# Patient Record
Sex: MALE | Race: WHITE | Employment: FULL TIME | URBAN - METROPOLITAN AREA
[De-identification: names, ages, dates, MRNs, and addresses within clinical notes are randomized per-mention and may not be internally consistent; named-entity substitution may affect disease eponyms.]

---

## 2020-05-27 ENCOUNTER — HOSPITAL ENCOUNTER (EMERGENCY)
Age: 35
Discharge: HOME | End: 2020-05-27
Attending: EMERGENCY MEDICINE
Payer: COMMERCIAL

## 2020-05-27 VITALS
WEIGHT: 197 LBS | RESPIRATION RATE: 20 BRPM | SYSTOLIC BLOOD PRESSURE: 144 MMHG | DIASTOLIC BLOOD PRESSURE: 90 MMHG | TEMPERATURE: 98.1 F | HEART RATE: 76 BPM | OXYGEN SATURATION: 100 %

## 2020-05-27 LAB
EKG ATRIAL RATE: 70 BPM
EKG DIAGNOSIS: NORMAL
EKG P AXIS: 55 DEGREES
EKG P-R INTERVAL: 140 MS
EKG Q-T INTERVAL: 372 MS
EKG QRS DURATION: 92 MS
EKG QTC CALCULATION (BAZETT): 401 MS
EKG R AXIS: 39 DEGREES
EKG T AXIS: 25 DEGREES
EKG VENTRICULAR RATE: 70 BPM

## 2020-05-27 PROCEDURE — 99284 EMERGENCY DEPT VISIT MOD MDM: CPT

## 2020-05-27 PROCEDURE — 93005 ELECTROCARDIOGRAM TRACING: CPT | Performed by: EMERGENCY MEDICINE

## 2020-05-27 PROCEDURE — 93010 ELECTROCARDIOGRAM REPORT: CPT | Performed by: INTERNAL MEDICINE

## 2020-05-27 RX ORDER — LISINOPRIL 10 MG/1
10 TABLET ORAL DAILY
COMMUNITY
End: 2020-06-15 | Stop reason: SDUPTHER

## 2020-05-27 NOTE — ED TRIAGE NOTES
Patient c/o approx 1 week palpitations, admits to incr stress, h/o PTSD (pt [employer]) and anxiety, + HTN. States drank more ETOH over weekend. SR per EKG.

## 2020-05-27 NOTE — ED PROVIDER NOTES
EMERGENCY DEPARTMENT PHYSICIAN DOCUMENTATION      CHIEF COMPLAINT  Palpitations      Patient information was obtained from patient. History/Exam limitations: none. HISTORY OF PRESENT ILLNESS  Wilma Boast is a 29 y.o. male with complaint of Palpitations  . Onset 1 to 2 days ago. [place] started noticing it recently. Comes and goes. Went to urgent care this morning and then obtain an EKG which apparently was interpreted as atrial fibrillation, however on my interpretation it appears to be clear sinus. Denies chest pain, shortness of breath, nausea vomiting diarrhea, rectal bleeding or dark stools. REVIEW OF SYSTEMS  A full 10 point Review of Systems was performed and is negative aside from pertinent positives mentioned in HPI    ALLERGIES:  No Known Allergies    PAST HISTORY  Past Medical History:   Diagnosis Date    Anxiety     Hypertension        History reviewed. No pertinent family history. No current facility-administered medications on file prior to encounter.       Current Outpatient Medications on File Prior to Encounter   Medication Sig Dispense Refill    lisinopril (PRINIVIL;ZESTRIL) 10 MG tablet Take 10 mg by mouth daily         Social History     Tobacco Use    Smoking status: Not on file   Substance Use Topics    Alcohol use: Not on file    Drug use: Not on file     +marijuana use, + occasional etoh    EXAM:   Presentation Vital Signs: BP (!) 144/90   Pulse 76   Temp 98.1 °F (36.7 °C) (Oral)   Resp 20   Wt 89.4 kg (197 lb)   SpO2 100%     General: Well nourished, no acute distress  Head: No traumatic injury  ENT: MMM, no facial asymmetry, no nasal discharge  Eyes: EOM  Neck: No tracheal deviation or stridor  Lungs: Respirations clear to ausculation, no respiratory distress  Cardiac: Regular rate and rhythm without gallops, murmurs, or rubs  Abdomen: Soft, nontender  Skin: no pallor, erythema,

## 2020-06-01 PROBLEM — I10 ESSENTIAL HYPERTENSION: Status: ACTIVE | Noted: 2017-06-28

## 2020-06-01 NOTE — PROGRESS NOTES
Chief Complaint   Patient presents with    New Patient    Hypertension         HPI:  Mike Felix is a 29 y.o. (: 1985) here today to establish care and for mgmt of chronic conditions. Recently seen in ED for palpitations and workup was unremarkable. Prescribed metoprolol and told to follow up with PCP. Today he feels better. He has not used the metoprolol. HTN  Rx: lisinopril 10 mg daily  Compliant: yes  Does check BP at home. Brought logs: low 100s over 70s  No results found for: CREATININE     Tobacco use:  Started in  and quit in   Quit nicotine, vaping and lost 20 lbs. Has history of situational anxiety and PTSD. Uses MJ with card. Would like to try something else instead of MJ. Open to inderal.     Has a small dark spot on right ear he would like evaluated by dermatology. Has been there for many years. Used to be a  and was in the sun a lot as a [employer]. Review of Systems   Constitutional: Negative for activity change, chills, fatigue and fever. HENT: Negative for congestion. Eyes: Negative for redness. Respiratory: Negative for cough, chest tightness, shortness of breath and wheezing. Cardiovascular: Negative for chest pain and palpitations. Gastrointestinal: Negative for abdominal pain and diarrhea. Genitourinary: Negative for difficulty urinating. Musculoskeletal: Negative for arthralgias. Skin: Negative for rash. Allergic/Immunologic: Negative for immunocompromised state. Neurological: Negative for dizziness, light-headedness and headaches. Hematological: Negative for adenopathy. Psychiatric/Behavioral: Negative for behavioral problems.        Past Medical History:   Diagnosis Date    Anxiety     Hypertension        Family History   Adopted: Yes       Social History     Tobacco Use    Smoking status: Former Smoker     Packs/day: 1.00     Years: 10.00     Pack years: 10.00     Types: Cigarettes     Last attempt to quit:  Years since quittin.4    Smokeless tobacco: Former User     Types: Chew     Quit date: 2013   Substance Use Topics    Alcohol use: Yes     Comment: Moderately -Socially    Drug use: Yes     Types: Marijuana     Comment: Has medical MJ card-PTSD-Anxiety       New Prescriptions    PROPRANOLOL (INDERAL) 20 MG TABLET    Take 1 tablet by mouth 3 times daily       Meds Prior to visit:  Current Outpatient Medications on File Prior to Visit   Medication Sig Dispense Refill    lisinopril (PRINIVIL;ZESTRIL) 10 MG tablet Take 10 mg by mouth daily       No current facility-administered medications on file prior to visit. Allergies   Allergen Reactions    Nebivolol Hcl Other (See Comments)     Pt SAYS THE MEDICATION MADE HIM FEEL UNWELL       OBJECTIVE:  BP (!) 157/71   Pulse 96   Temp 97.3 °F (36.3 °C)   Ht 5' 9\" (1.753 m)   Wt 200 lb (90.7 kg)   BMI 29.53 kg/m²   BP Readings from Last 2 Encounters:   20 (!) 157/71   20 (!) 144/90     Wt Readings from Last 3 Encounters:   20 200 lb (90.7 kg)   20 197 lb (89.4 kg)       Physical Exam  Vitals signs and nursing note reviewed. Constitutional:       General: He is not in acute distress. Appearance: Normal appearance. HENT:      Head: Normocephalic and atraumatic. Left Ear: External ear normal.      Ears:        Comments: Hyperpigmented lesion, smooth borders     Nose: Nose normal.      Mouth/Throat:      Mouth: Mucous membranes are moist.      Pharynx: Oropharynx is clear. Eyes:      Extraocular Movements: Extraocular movements intact. Conjunctiva/sclera: Conjunctivae normal.      Pupils: Pupils are equal, round, and reactive to light. Neck:      Musculoskeletal: Normal range of motion. Cardiovascular:      Rate and Rhythm: Normal rate and regular rhythm. Pulses: Normal pulses. Heart sounds: Normal heart sounds. Pulmonary:      Effort: Pulmonary effort is normal. No respiratory distress.

## 2020-06-02 ENCOUNTER — OFFICE VISIT (OUTPATIENT)
Dept: PRIMARY CARE CLINIC | Age: 35
End: 2020-06-02
Payer: COMMERCIAL

## 2020-06-02 VITALS
HEIGHT: 69 IN | TEMPERATURE: 97.3 F | WEIGHT: 200 LBS | SYSTOLIC BLOOD PRESSURE: 157 MMHG | HEART RATE: 96 BPM | BODY MASS INDEX: 29.62 KG/M2 | DIASTOLIC BLOOD PRESSURE: 71 MMHG

## 2020-06-02 DIAGNOSIS — I10 ESSENTIAL HYPERTENSION: ICD-10-CM

## 2020-06-02 LAB
A/G RATIO: 2.2 (ref 1.1–2.2)
ALBUMIN SERPL-MCNC: 4.7 G/DL (ref 3.4–5)
ALP BLD-CCNC: 107 U/L (ref 40–129)
ALT SERPL-CCNC: 16 U/L (ref 10–40)
ANION GAP SERPL CALCULATED.3IONS-SCNC: 11 MMOL/L (ref 3–16)
AST SERPL-CCNC: 18 U/L (ref 15–37)
BASOPHILS ABSOLUTE: 0 K/UL (ref 0–0.2)
BASOPHILS RELATIVE PERCENT: 0.4 %
BILIRUB SERPL-MCNC: <0.2 MG/DL (ref 0–1)
BUN BLDV-MCNC: 16 MG/DL (ref 7–20)
CALCIUM SERPL-MCNC: 9.5 MG/DL (ref 8.3–10.6)
CHLORIDE BLD-SCNC: 104 MMOL/L (ref 99–110)
CHOLESTEROL, TOTAL: 128 MG/DL (ref 0–199)
CO2: 25 MMOL/L (ref 21–32)
CREAT SERPL-MCNC: 0.7 MG/DL (ref 0.9–1.3)
EOSINOPHILS ABSOLUTE: 0.3 K/UL (ref 0–0.6)
EOSINOPHILS RELATIVE PERCENT: 5.6 %
GFR AFRICAN AMERICAN: >60
GFR NON-AFRICAN AMERICAN: >60
GLOBULIN: 2.1 G/DL
GLUCOSE BLD-MCNC: 94 MG/DL (ref 70–99)
HCT VFR BLD CALC: 44.1 % (ref 40.5–52.5)
HDLC SERPL-MCNC: 40 MG/DL (ref 40–60)
HEMOGLOBIN: 15 G/DL (ref 13.5–17.5)
LDL CHOLESTEROL CALCULATED: 68 MG/DL
LYMPHOCYTES ABSOLUTE: 1.7 K/UL (ref 1–5.1)
LYMPHOCYTES RELATIVE PERCENT: 27.9 %
MCH RBC QN AUTO: 30.7 PG (ref 26–34)
MCHC RBC AUTO-ENTMCNC: 33.9 G/DL (ref 31–36)
MCV RBC AUTO: 90.5 FL (ref 80–100)
MONOCYTES ABSOLUTE: 0.4 K/UL (ref 0–1.3)
MONOCYTES RELATIVE PERCENT: 7.2 %
NEUTROPHILS ABSOLUTE: 3.6 K/UL (ref 1.7–7.7)
NEUTROPHILS RELATIVE PERCENT: 58.9 %
PDW BLD-RTO: 12.9 % (ref 12.4–15.4)
PLATELET # BLD: 220 K/UL (ref 135–450)
PMV BLD AUTO: 10.1 FL (ref 5–10.5)
POTASSIUM SERPL-SCNC: 4.4 MMOL/L (ref 3.5–5.1)
RBC # BLD: 4.87 M/UL (ref 4.2–5.9)
SODIUM BLD-SCNC: 140 MMOL/L (ref 136–145)
TOTAL PROTEIN: 6.8 G/DL (ref 6.4–8.2)
TRIGL SERPL-MCNC: 98 MG/DL (ref 0–150)
VLDLC SERPL CALC-MCNC: 20 MG/DL
WBC # BLD: 6.1 K/UL (ref 4–11)

## 2020-06-02 PROCEDURE — G8427 DOCREV CUR MEDS BY ELIG CLIN: HCPCS | Performed by: FAMILY MEDICINE

## 2020-06-02 PROCEDURE — G8419 CALC BMI OUT NRM PARAM NOF/U: HCPCS | Performed by: FAMILY MEDICINE

## 2020-06-02 PROCEDURE — 1036F TOBACCO NON-USER: CPT | Performed by: FAMILY MEDICINE

## 2020-06-02 PROCEDURE — 99204 OFFICE O/P NEW MOD 45 MIN: CPT | Performed by: FAMILY MEDICINE

## 2020-06-02 RX ORDER — PROPRANOLOL HYDROCHLORIDE 20 MG/1
20 TABLET ORAL 3 TIMES DAILY
Qty: 90 TABLET | Refills: 3 | Status: SHIPPED | OUTPATIENT
Start: 2020-06-02

## 2020-06-02 ASSESSMENT — PATIENT HEALTH QUESTIONNAIRE - PHQ9
1. LITTLE INTEREST OR PLEASURE IN DOING THINGS: 0
SUM OF ALL RESPONSES TO PHQ QUESTIONS 1-9: 0
2. FEELING DOWN, DEPRESSED OR HOPELESS: 0
SUM OF ALL RESPONSES TO PHQ QUESTIONS 1-9: 0
SUM OF ALL RESPONSES TO PHQ9 QUESTIONS 1 & 2: 0

## 2020-06-02 ASSESSMENT — ENCOUNTER SYMPTOMS
WHEEZING: 0
SHORTNESS OF BREATH: 0
DIARRHEA: 0
CHEST TIGHTNESS: 0
EYE REDNESS: 0
ABDOMINAL PAIN: 0
COUGH: 0

## 2020-06-15 ENCOUNTER — PATIENT MESSAGE (OUTPATIENT)
Dept: PRIMARY CARE CLINIC | Age: 35
End: 2020-06-15

## 2020-06-15 RX ORDER — LISINOPRIL 10 MG/1
10 TABLET ORAL DAILY
Qty: 30 TABLET | Refills: 3 | Status: SHIPPED | OUTPATIENT
Start: 2020-06-15 | End: 2020-09-15 | Stop reason: SDUPTHER

## 2020-06-15 NOTE — TELEPHONE ENCOUNTER
From: Mary Hart  To: Justin Bowen MD  Sent: 6/15/2020 7:50 AM EDT  Subject: Prescription Question    Good morning. I am prescribed Lisinopril 10MG but it will not let me request a refill via MyChart. Would it be possible to change that or set up the refill online? thanks!

## 2020-09-09 NOTE — PROGRESS NOTES
Chief Complaint   Patient presents with    Hypertension    Anxiety         HPI:  Sonia Quigley is a 29 y.o. (: 1985) here today for 3 month follow up. HTN  Rx: lisinopril 10 mg daily  --> feels like this is causing an erectile dysfunction   He may want to try sildenafil. He has in the past and it helped. Also discussed switching blood pressure medicines to Norvasc. Will think about this and get back to me. When he checks blood pressure at home it is around 120/80 on lisinopril. Compliant: yes  Lab Results   Component Value Date    CREATININE 0.7 (L) 2020     Anxiety  Was using MJ but wanted to try Inderal.  Only used Inderal twice. Now that he is working from home, his stress about presentations at work is much better. He may have some PTSD from his previous [employer] history. He would like to establish with behavioral health sometime in the near future. Review of Systems   Constitutional: Negative for appetite change, chills, diaphoresis, fatigue, fever and unexpected weight change. HENT: Negative for congestion, postnasal drip, rhinorrhea, sinus pressure, sneezing and sore throat. Eyes: Negative for redness, itching and visual disturbance. Respiratory: Negative for cough, chest tightness, shortness of breath and wheezing. Cardiovascular: Negative for chest pain, palpitations and leg swelling. Gastrointestinal: Negative for abdominal pain, blood in stool, constipation, diarrhea, nausea and vomiting. Endocrine: Negative for cold intolerance, heat intolerance, polydipsia and polyuria. Genitourinary: Negative for decreased urine volume and dysuria. Musculoskeletal: Negative for arthralgias, back pain, joint swelling, myalgias and neck pain. Skin: Negative for rash and wound. Allergic/Immunologic: Negative for environmental allergies. Neurological: Negative for dizziness, tremors, syncope, weakness, light-headedness, numbness and headaches.    Hematological: Negative for adenopathy. Does not bruise/bleed easily. Psychiatric/Behavioral: Negative for agitation, behavioral problems, confusion, decreased concentration, self-injury, sleep disturbance and suicidal ideas. The patient is nervous/anxious. Past Medical History:   Diagnosis Date    Anxiety     Hypertension        Family History   Adopted: Yes       Social History     Tobacco Use    Smoking status: Former Smoker     Packs/day: 1.00     Years: 10.00     Pack years: 10.00     Types: Cigarettes     Last attempt to quit:      Years since quittin.6    Smokeless tobacco: Former User     Types: Chew     Quit date: 2013   Substance Use Topics    Alcohol use: Yes     Comment: Moderately -Socially    Drug use: Yes     Types: Marijuana     Comment: Has medical MJ card-PTSD-Anxiety       New Prescriptions    No medications on file       Meds Prior to visit:  Current Outpatient Medications on File Prior to Visit   Medication Sig Dispense Refill    lisinopril (PRINIVIL;ZESTRIL) 10 MG tablet Take 1 tablet by mouth daily 30 tablet 3    propranolol (INDERAL) 20 MG tablet Take 1 tablet by mouth 3 times daily 90 tablet 3     No current facility-administered medications on file prior to visit. Allergies   Allergen Reactions    Nebivolol Hcl Other (See Comments)     Pt SAYS THE MEDICATION MADE HIM FEEL UNWELL       OBJECTIVE:  BP (!) 157/95   Pulse 101   Temp 97.8 °F (36.6 °C)   Ht 5' 9\" (1.753 m)   Wt 198 lb (89.8 kg)   BMI 29.24 kg/m²   BP Readings from Last 2 Encounters:   09/10/20 (!) 157/95   20 (!) 157/71     Wt Readings from Last 3 Encounters:   09/10/20 198 lb (89.8 kg)   20 200 lb (90.7 kg)   20 197 lb (89.4 kg)       Physical Exam  Vitals signs and nursing note reviewed. Constitutional:       General: He is not in acute distress. Appearance: Normal appearance. He is well-developed and normal weight. He is not ill-appearing. HENT:      Head: Normocephalic and atraumatic. Right Ear: Tympanic membrane, ear canal and external ear normal. There is no impacted cerumen. Left Ear: Tympanic membrane, ear canal and external ear normal. There is impacted cerumen. Nose: Nose normal. No congestion or rhinorrhea. Mouth/Throat:      Mouth: Mucous membranes are moist.      Pharynx: Oropharynx is clear. No oropharyngeal exudate or posterior oropharyngeal erythema. Eyes:      General: No scleral icterus. Right eye: No discharge. Left eye: No discharge. Extraocular Movements: Extraocular movements intact. Conjunctiva/sclera: Conjunctivae normal.      Pupils: Pupils are equal, round, and reactive to light. Neck:      Musculoskeletal: Normal range of motion and neck supple. Cardiovascular:      Rate and Rhythm: Normal rate and regular rhythm. Pulses: Normal pulses. Heart sounds: Normal heart sounds. No murmur. Comments: Normal radial and pedal pulses  Pulmonary:      Effort: Pulmonary effort is normal. No respiratory distress. Breath sounds: Normal breath sounds. No wheezing. Chest:      Chest wall: No tenderness. Abdominal:      General: Bowel sounds are normal. There is no distension. Palpations: Abdomen is soft. There is no mass. Tenderness: There is no abdominal tenderness. Comments: Normal liver and spleen, no organomegaly. Musculoskeletal: Normal range of motion. General: No tenderness, deformity or signs of injury. Right lower leg: No edema. Left lower leg: No edema. Comments: Range of motion intact in all extremities   Lymphadenopathy:      Cervical: No cervical adenopathy. Skin:     General: Skin is warm and dry. Capillary Refill: Capillary refill takes less than 2 seconds. Findings: No erythema or rash. Neurological:      General: No focal deficit present. Mental Status: He is alert and oriented to person, place, and time. Mental status is at baseline. Sensory: No sensory deficit. Motor: No abnormal muscle tone. Coordination: Coordination normal.   Psychiatric:         Mood and Affect: Mood normal.         Behavior: Behavior normal.         Thought Content: Thought content normal.         Judgment: Judgment normal.      Comments:           Lab Results   Component Value Date    WBC 6.1 06/02/2020    HGB 15.0 06/02/2020    HCT 44.1 06/02/2020    MCV 90.5 06/02/2020     06/02/2020     Lab Results   Component Value Date     06/02/2020    K 4.4 06/02/2020     06/02/2020    CO2 25 06/02/2020    BUN 16 06/02/2020    CREATININE 0.7 (L) 06/02/2020    GLUCOSE 94 06/02/2020    CALCIUM 9.5 06/02/2020    PROT 6.8 06/02/2020    LABALBU 4.7 06/02/2020    BILITOT <0.2 06/02/2020    ALKPHOS 107 06/02/2020    AST 18 06/02/2020    ALT 16 06/02/2020    LABGLOM >60 06/02/2020    GFRAA >60 06/02/2020    AGRATIO 2.2 06/02/2020    GLOB 2.1 06/02/2020     Lab Results   Component Value Date    CHOL 128 06/02/2020     Lab Results   Component Value Date    TRIG 98 06/02/2020     Lab Results   Component Value Date    HDL 40 06/02/2020     Lab Results   Component Value Date    LDLCALC 68 06/02/2020     Lab Results   Component Value Date    LABVLDL 20 06/02/2020     No results found for: LABA1C      ASSESSMENT/PLAN:  1. Essential hypertension  We will continue his lisinopril 10 mg daily since it is helping his blood pressure. He always gets nervous when he comes to the doctor's office but at home, he gets normal numbers. Renal function is up-to-date. Follow-up in 3 months. Will let me know if he needs Norvasc instead to see if this does not cause erectile dysfunction. 2. Situational anxiety  Refer to behavioral health for possible PTSD. He thinks this may be causing his underlying anxiety and blood pressure. We will continue taking Inderal as needed and smoking marijuana which he says helps the best  - Ambulatory referral to Psychology    3.   Erectile dysfunction  May be side effect of lisinopril. This is what he thinks. Does not want anything just yet. Options are to try Norvasc or add sildenafil to lisinopril. Discussed use, benefit, and side effects of prescribed medications. Barriers to medication compliance addressed. All patient questions answered. Pt voiced understanding. RTC Return in about 3 months (around 12/10/2020) for BP and Situational Anxiety. No future appointments.     Kittie Buerger, MD  9/10/2020  2:58 PM

## 2020-09-10 ENCOUNTER — OFFICE VISIT (OUTPATIENT)
Dept: PRIMARY CARE CLINIC | Age: 35
End: 2020-09-10
Payer: COMMERCIAL

## 2020-09-10 VITALS
SYSTOLIC BLOOD PRESSURE: 157 MMHG | HEIGHT: 69 IN | DIASTOLIC BLOOD PRESSURE: 95 MMHG | WEIGHT: 198 LBS | BODY MASS INDEX: 29.33 KG/M2 | HEART RATE: 101 BPM | TEMPERATURE: 97.8 F

## 2020-09-10 PROBLEM — F41.8 SITUATIONAL ANXIETY: Status: ACTIVE | Noted: 2020-09-10

## 2020-09-10 PROCEDURE — G8419 CALC BMI OUT NRM PARAM NOF/U: HCPCS | Performed by: FAMILY MEDICINE

## 2020-09-10 PROCEDURE — 99214 OFFICE O/P EST MOD 30 MIN: CPT | Performed by: FAMILY MEDICINE

## 2020-09-10 PROCEDURE — G8427 DOCREV CUR MEDS BY ELIG CLIN: HCPCS | Performed by: FAMILY MEDICINE

## 2020-09-10 PROCEDURE — 1036F TOBACCO NON-USER: CPT | Performed by: FAMILY MEDICINE

## 2020-09-10 ASSESSMENT — ENCOUNTER SYMPTOMS
SHORTNESS OF BREATH: 0
CONSTIPATION: 0
EYE ITCHING: 0
COUGH: 0
BLOOD IN STOOL: 0
BACK PAIN: 0
ABDOMINAL PAIN: 0
CHEST TIGHTNESS: 0
EYE REDNESS: 0
SINUS PRESSURE: 0
SORE THROAT: 0
NAUSEA: 0
DIARRHEA: 0
VOMITING: 0
WHEEZING: 0
RHINORRHEA: 0

## 2020-09-10 ASSESSMENT — PATIENT HEALTH QUESTIONNAIRE - PHQ9
SUM OF ALL RESPONSES TO PHQ QUESTIONS 1-9: 0
SUM OF ALL RESPONSES TO PHQ9 QUESTIONS 1 & 2: 0
2. FEELING DOWN, DEPRESSED OR HOPELESS: 0
SUM OF ALL RESPONSES TO PHQ QUESTIONS 1-9: 0
1. LITTLE INTEREST OR PLEASURE IN DOING THINGS: 0

## 2020-09-14 ENCOUNTER — PATIENT MESSAGE (OUTPATIENT)
Dept: PRIMARY CARE CLINIC | Age: 35
End: 2020-09-14

## 2020-09-15 RX ORDER — SILDENAFIL 50 MG/1
TABLET, FILM COATED ORAL
COMMUNITY
Start: 2020-08-15 | End: 2020-09-15 | Stop reason: SDUPTHER

## 2020-09-15 RX ORDER — SILDENAFIL 50 MG/1
25 TABLET, FILM COATED ORAL PRN
Qty: 30 TABLET | Refills: 3 | Status: SHIPPED | OUTPATIENT
Start: 2020-09-15

## 2020-09-15 RX ORDER — LISINOPRIL 10 MG/1
10 TABLET ORAL DAILY
Qty: 30 TABLET | Refills: 3 | Status: SHIPPED | OUTPATIENT
Start: 2020-09-15 | End: 2020-12-08 | Stop reason: SDUPTHER

## 2020-10-12 NOTE — PROGRESS NOTES
Chief Complaint   Patient presents with    Pharyngitis     about 1 wks, neck feels tight and swollen, brother in law has a throat infrection. does not feel sick       HPI:  Sania Powers is a 28 y.o. (: 1985) here today for sore neck and swollen LNs. Started 2 weeks ago. Throat was sore and he saw NP who said it was likely allergies and PND. Started to get better with antihistamine. No fevers, chills, sick contacts, cough, SOB, trouble swallowing, GI symptoms. Elevated BP in office as usual.   Too BP before he left home after a run and it was 130/77. Took his lisinopril today. He is compliant    Review of Systems   Constitutional: Negative for appetite change, chills, diaphoresis, fatigue, fever and unexpected weight change. HENT: Positive for sore throat. Negative for congestion, postnasal drip, rhinorrhea, sinus pressure and sneezing. Eyes: Negative for redness, itching and visual disturbance. Respiratory: Negative for cough, chest tightness, shortness of breath and wheezing. Cardiovascular: Negative for chest pain, palpitations and leg swelling. Gastrointestinal: Negative for abdominal pain, blood in stool, constipation, diarrhea, nausea and vomiting. Endocrine: Negative for cold intolerance, heat intolerance, polydipsia and polyuria. Genitourinary: Negative for decreased urine volume and dysuria. Musculoskeletal: Negative for arthralgias, back pain, joint swelling, myalgias and neck pain. Skin: Negative for rash and wound. Allergic/Immunologic: Negative for environmental allergies. Neurological: Negative for dizziness, tremors, syncope, weakness, light-headedness, numbness and headaches. Hematological: Positive for adenopathy. Does not bruise/bleed easily. Psychiatric/Behavioral: Negative for agitation, behavioral problems, confusion, decreased concentration, self-injury, sleep disturbance and suicidal ideas. The patient is not nervous/anxious.         Past Medical History:   Diagnosis Date    Anxiety     Hypertension        Family History   Adopted: Yes       Social History     Tobacco Use    Smoking status: Former Smoker     Packs/day: 1.00     Years: 10.00     Pack years: 10.00     Types: Cigarettes     Last attempt to quit: 2013     Years since quittin.7    Smokeless tobacco: Former User     Types: Chew     Quit date: 2013   Substance Use Topics    Alcohol use: Yes     Comment: Moderately -Socially    Drug use: Yes     Types: Marijuana     Comment: Has medical MJ card-PTSD-Anxiety       New Prescriptions    No medications on file       Meds Prior to visit:  Current Outpatient Medications on File Prior to Visit   Medication Sig Dispense Refill    lisinopril (PRINIVIL;ZESTRIL) 10 MG tablet Take 1 tablet by mouth daily 30 tablet 3    sildenafil (VIAGRA) 50 MG tablet Take 0.5 tablets by mouth as needed for Erectile Dysfunction 30 tablet 3    propranolol (INDERAL) 20 MG tablet Take 1 tablet by mouth 3 times daily 90 tablet 3     No current facility-administered medications on file prior to visit. Allergies   Allergen Reactions    Nebivolol Hcl Other (See Comments)     Pt SAYS THE MEDICATION MADE HIM FEEL UNWELL       OBJECTIVE:  BP (!) 161/105   Pulse 97   Temp 97.7 °F (36.5 °C) (Temporal)   Resp 16   Wt 205 lb (93 kg)   BMI 30.27 kg/m²   BP Readings from Last 2 Encounters:   10/13/20 (!) 161/105   09/10/20 (!) 157/95     Wt Readings from Last 3 Encounters:   10/13/20 205 lb (93 kg)   09/10/20 198 lb (89.8 kg)   20 200 lb (90.7 kg)       Physical Exam  Vitals signs reviewed. Constitutional:       General: He is not in acute distress. Appearance: Normal appearance. He is well-developed. He is not ill-appearing. HENT:      Head: Normocephalic and atraumatic. Right Ear: Tympanic membrane, ear canal and external ear normal. There is no impacted cerumen.       Left Ear: Tympanic membrane, ear canal and external ear normal. There is no impacted cerumen. Nose: Nose normal. No congestion or rhinorrhea. Mouth/Throat:      Mouth: Mucous membranes are moist.      Pharynx: Oropharynx is clear. No oropharyngeal exudate or posterior oropharyngeal erythema. Eyes:      General: No scleral icterus. Right eye: No discharge. Left eye: No discharge. Extraocular Movements: Extraocular movements intact. Conjunctiva/sclera: Conjunctivae normal.      Pupils: Pupils are equal, round, and reactive to light. Neck:      Musculoskeletal: Normal range of motion and neck supple. No neck rigidity or muscular tenderness. Vascular: No carotid bruit. Cardiovascular:      Rate and Rhythm: Normal rate and regular rhythm. Pulses: Normal pulses. Heart sounds: Normal heart sounds. No murmur. Comments: Normal radial and pedal pulses  Pulmonary:      Effort: Pulmonary effort is normal. No respiratory distress. Breath sounds: Normal breath sounds. No wheezing. Chest:      Chest wall: No tenderness. Abdominal:      General: Bowel sounds are normal. There is no distension. Palpations: Abdomen is soft. There is no mass. Tenderness: There is no abdominal tenderness. Comments: Normal liver and spleen, no organomegaly. Musculoskeletal: Normal range of motion. General: No tenderness or deformity. Right lower leg: No edema. Left lower leg: No edema. Comments: Range of motion intact in all extremities   Lymphadenopathy:      Cervical: No cervical adenopathy. Skin:     General: Skin is warm and dry. Capillary Refill: Capillary refill takes less than 2 seconds. Findings: No erythema or rash. Neurological:      General: No focal deficit present. Mental Status: He is alert and oriented to person, place, and time. Mental status is at baseline. Cranial Nerves: No cranial nerve deficit. Sensory: No sensory deficit. Motor: No weakness or abnormal muscle tone. Emily Villegas MD  10/13/2020  12:38 PM

## 2020-10-13 ENCOUNTER — OFFICE VISIT (OUTPATIENT)
Dept: PRIMARY CARE CLINIC | Age: 35
End: 2020-10-13
Payer: COMMERCIAL

## 2020-10-13 VITALS
SYSTOLIC BLOOD PRESSURE: 161 MMHG | DIASTOLIC BLOOD PRESSURE: 105 MMHG | RESPIRATION RATE: 16 BRPM | TEMPERATURE: 97.7 F | WEIGHT: 205 LBS | HEART RATE: 97 BPM | BODY MASS INDEX: 30.27 KG/M2

## 2020-10-13 PROCEDURE — 1036F TOBACCO NON-USER: CPT | Performed by: FAMILY MEDICINE

## 2020-10-13 PROCEDURE — G8417 CALC BMI ABV UP PARAM F/U: HCPCS | Performed by: FAMILY MEDICINE

## 2020-10-13 PROCEDURE — 99214 OFFICE O/P EST MOD 30 MIN: CPT | Performed by: FAMILY MEDICINE

## 2020-10-13 PROCEDURE — G8484 FLU IMMUNIZE NO ADMIN: HCPCS | Performed by: FAMILY MEDICINE

## 2020-10-13 PROCEDURE — G8427 DOCREV CUR MEDS BY ELIG CLIN: HCPCS | Performed by: FAMILY MEDICINE

## 2020-10-13 ASSESSMENT — ENCOUNTER SYMPTOMS
CHEST TIGHTNESS: 0
EYE REDNESS: 0
COUGH: 0
SINUS PRESSURE: 0
CONSTIPATION: 0
VOMITING: 0
SORE THROAT: 1
ABDOMINAL PAIN: 0
BACK PAIN: 0
DIARRHEA: 0
BLOOD IN STOOL: 0
NAUSEA: 0
RHINORRHEA: 0
SHORTNESS OF BREATH: 0
EYE ITCHING: 0
WHEEZING: 0

## 2020-11-02 ENCOUNTER — TELEPHONE (OUTPATIENT)
Dept: PRIMARY CARE CLINIC | Age: 35
End: 2020-11-02

## 2020-11-02 NOTE — TELEPHONE ENCOUNTER
Patient calling wife just tested positive for COVID and with patient having high blood pressure he was concerned. He would like to know if he needs to take any extra precautions? He states that he will go to the walkin clinic for testing by his house.

## 2020-11-02 NOTE — TELEPHONE ENCOUNTER
Patient does not need to take any extra precaution. Try to social distance, wash hands and wear a mask. If you would like a Covid test, let me know and I will order an ambulatory rapid test at the flu clinic.     Dr. Marilee Dale

## 2020-12-08 RX ORDER — LISINOPRIL 10 MG/1
10 TABLET ORAL DAILY
Qty: 30 TABLET | Refills: 3 | Status: SHIPPED | OUTPATIENT
Start: 2020-12-08 | End: 2021-02-24 | Stop reason: SDUPTHER

## 2021-02-24 DIAGNOSIS — I10 ESSENTIAL HYPERTENSION: ICD-10-CM

## 2021-02-24 RX ORDER — LISINOPRIL 10 MG/1
10 TABLET ORAL DAILY
Qty: 30 TABLET | Refills: 3 | Status: SHIPPED | OUTPATIENT
Start: 2021-02-24

## 2022-11-29 ENCOUNTER — OFFICE VISIT (OUTPATIENT)
Dept: PRIMARY CARE CLINIC | Age: 37
End: 2022-11-29
Payer: COMMERCIAL

## 2022-11-29 VITALS
WEIGHT: 213 LBS | HEART RATE: 102 BPM | BODY MASS INDEX: 31.55 KG/M2 | TEMPERATURE: 97 F | DIASTOLIC BLOOD PRESSURE: 81 MMHG | SYSTOLIC BLOOD PRESSURE: 164 MMHG | HEIGHT: 69 IN

## 2022-11-29 DIAGNOSIS — I10 ESSENTIAL HYPERTENSION: ICD-10-CM

## 2022-11-29 DIAGNOSIS — I10 ESSENTIAL HYPERTENSION: Primary | ICD-10-CM

## 2022-11-29 DIAGNOSIS — R19.7 DIARRHEA, UNSPECIFIED TYPE: ICD-10-CM

## 2022-11-29 DIAGNOSIS — R19.5 MUCUS IN STOOL: ICD-10-CM

## 2022-11-29 DIAGNOSIS — K92.1 BLOOD IN STOOL: ICD-10-CM

## 2022-11-29 LAB
A/G RATIO: 2.3 (ref 1.1–2.2)
ALBUMIN SERPL-MCNC: 4.6 G/DL (ref 3.4–5)
ALP BLD-CCNC: 96 U/L (ref 40–129)
ALT SERPL-CCNC: 19 U/L (ref 10–40)
ANION GAP SERPL CALCULATED.3IONS-SCNC: 12 MMOL/L (ref 3–16)
AST SERPL-CCNC: 17 U/L (ref 15–37)
BASOPHILS ABSOLUTE: 0 K/UL (ref 0–0.2)
BASOPHILS RELATIVE PERCENT: 0.3 %
BILIRUB SERPL-MCNC: 0.3 MG/DL (ref 0–1)
BUN BLDV-MCNC: 12 MG/DL (ref 7–20)
CALCIUM SERPL-MCNC: 9.8 MG/DL (ref 8.3–10.6)
CHLORIDE BLD-SCNC: 106 MMOL/L (ref 99–110)
CO2: 25 MMOL/L (ref 21–32)
CREAT SERPL-MCNC: 0.7 MG/DL (ref 0.9–1.3)
EOSINOPHILS ABSOLUTE: 0.1 K/UL (ref 0–0.6)
EOSINOPHILS RELATIVE PERCENT: 1.4 %
GFR SERPL CREATININE-BSD FRML MDRD: >60 ML/MIN/{1.73_M2}
GLUCOSE BLD-MCNC: 104 MG/DL (ref 70–99)
HCT VFR BLD CALC: 46.9 % (ref 40.5–52.5)
HEMOGLOBIN: 15.6 G/DL (ref 13.5–17.5)
IGA: 91 MG/DL (ref 70–400)
LYMPHOCYTES ABSOLUTE: 1.7 K/UL (ref 1–5.1)
LYMPHOCYTES RELATIVE PERCENT: 26.4 %
MCH RBC QN AUTO: 30.2 PG (ref 26–34)
MCHC RBC AUTO-ENTMCNC: 33.4 G/DL (ref 31–36)
MCV RBC AUTO: 90.5 FL (ref 80–100)
MONOCYTES ABSOLUTE: 0.3 K/UL (ref 0–1.3)
MONOCYTES RELATIVE PERCENT: 5.1 %
NEUTROPHILS ABSOLUTE: 4.4 K/UL (ref 1.7–7.7)
NEUTROPHILS RELATIVE PERCENT: 66.8 %
PDW BLD-RTO: 12.8 % (ref 12.4–15.4)
PLATELET # BLD: 227 K/UL (ref 135–450)
PMV BLD AUTO: 10.6 FL (ref 5–10.5)
POTASSIUM SERPL-SCNC: 4.5 MMOL/L (ref 3.5–5.1)
RBC # BLD: 5.18 M/UL (ref 4.2–5.9)
SODIUM BLD-SCNC: 143 MMOL/L (ref 136–145)
TOTAL PROTEIN: 6.6 G/DL (ref 6.4–8.2)
WBC # BLD: 6.6 K/UL (ref 4–11)

## 2022-11-29 PROCEDURE — G8417 CALC BMI ABV UP PARAM F/U: HCPCS | Performed by: FAMILY MEDICINE

## 2022-11-29 PROCEDURE — G8484 FLU IMMUNIZE NO ADMIN: HCPCS | Performed by: FAMILY MEDICINE

## 2022-11-29 PROCEDURE — 99214 OFFICE O/P EST MOD 30 MIN: CPT | Performed by: FAMILY MEDICINE

## 2022-11-29 PROCEDURE — 1036F TOBACCO NON-USER: CPT | Performed by: FAMILY MEDICINE

## 2022-11-29 PROCEDURE — G8427 DOCREV CUR MEDS BY ELIG CLIN: HCPCS | Performed by: FAMILY MEDICINE

## 2022-11-29 PROCEDURE — 3077F SYST BP >= 140 MM HG: CPT | Performed by: FAMILY MEDICINE

## 2022-11-29 PROCEDURE — 3078F DIAST BP <80 MM HG: CPT | Performed by: FAMILY MEDICINE

## 2022-11-29 RX ORDER — AZELAIC ACID 0.15 G/G
GEL TOPICAL
COMMUNITY
Start: 2022-11-17 | End: 2022-11-29

## 2022-11-29 SDOH — ECONOMIC STABILITY: FOOD INSECURITY: WITHIN THE PAST 12 MONTHS, THE FOOD YOU BOUGHT JUST DIDN'T LAST AND YOU DIDN'T HAVE MONEY TO GET MORE.: NEVER TRUE

## 2022-11-29 SDOH — ECONOMIC STABILITY: FOOD INSECURITY: WITHIN THE PAST 12 MONTHS, YOU WORRIED THAT YOUR FOOD WOULD RUN OUT BEFORE YOU GOT MONEY TO BUY MORE.: NEVER TRUE

## 2022-11-29 ASSESSMENT — ENCOUNTER SYMPTOMS
ABDOMINAL DISTENTION: 0
DIARRHEA: 1
WHEEZING: 0
COUGH: 0
RECTAL PAIN: 0
VOMITING: 0
EYE REDNESS: 0
CONSTIPATION: 0
BLOOD IN STOOL: 1
SHORTNESS OF BREATH: 0
CHEST TIGHTNESS: 0
NAUSEA: 0
ABDOMINAL PAIN: 0

## 2022-11-29 ASSESSMENT — SOCIAL DETERMINANTS OF HEALTH (SDOH): HOW HARD IS IT FOR YOU TO PAY FOR THE VERY BASICS LIKE FOOD, HOUSING, MEDICAL CARE, AND HEATING?: NOT HARD AT ALL

## 2022-11-29 ASSESSMENT — PATIENT HEALTH QUESTIONNAIRE - PHQ9
SUM OF ALL RESPONSES TO PHQ QUESTIONS 1-9: 0
SUM OF ALL RESPONSES TO PHQ9 QUESTIONS 1 & 2: 0
1. LITTLE INTEREST OR PLEASURE IN DOING THINGS: 0
SUM OF ALL RESPONSES TO PHQ QUESTIONS 1-9: 0
2. FEELING DOWN, DEPRESSED OR HOPELESS: 0
SUM OF ALL RESPONSES TO PHQ QUESTIONS 1-9: 0
SUM OF ALL RESPONSES TO PHQ QUESTIONS 1-9: 0

## 2022-11-29 NOTE — PROGRESS NOTES
Chief Complaint   Patient presents with    Follow-up     Ibs, stomach  issues on and off for a few months needs a referral for gi            ASSESSMENT/PLAN:  1. Essential hypertension  Uncontrolled in triage but home numbers are normal  Update labs  Continue lisinopril 10 mg and checking blood pressure at home periodically  If 140/90 or above, will let me know and we will titrate medication accordingly  - Comprehensive Metabolic Panel; Future  - CBC with Auto Differential; Future    2. Blood in stool  Referral placed to gastroenterology per patient request  Rule out celiac disease given sensitivity to wheat  Follow-up in 4 to 6 weeks with dietary changes discussed  - ROYCE Mercado MD, Gastroenterology, [facility]  - Celiac Screen with Reflex; Future    3. Diarrhea, unspecified type  As above  - Mykel Rachel MD, Gastroenterology, [facility]  - Celiac Screen with Reflex; Future    4. Mucus in stool  As above  - ROYCE Mercado MD, Gastroenterology, [facility]  - Celiac Screen with Reflex; Future       HPI:  Juan A Valente is a 40 y.o. (: 1985) here today for chronic condition mgmt and GI issues. HTN  Rx: lisinopril 10 mg daily  When he checks blood pressure at home it is around 120s/80s on lisinopril. BP Readings from Last 3 Encounters:   22 (!) 164/81   10/13/20 (!) 161/105   09/10/20 (!) 157/95     Lab Results   Component Value Date    CREATININE 0.7 (L) 2022     Wt Readings from Last 3 Encounters:   22 213 lb (96.6 kg)   10/13/20 205 lb (93 kg)   09/10/20 198 lb (89.8 kg)     Recently moved back from [place] and has been having GI issues. For the last 9 months he has been having bouts of diarrhea and stomach pain. Has kept track and these issues seem to be caused by wheat, pork and large amounts of cheese. He will experience diarrhea 4 days along with mucus and blood.   No abdominal pain, fevers, chills, decrease in appetite, nausea, vomiting or acid reflux. These flares will happen once a month or once every 2 months. Does have distant history of hemorrhoids. Review of Systems   Constitutional:  Negative for activity change, chills, fatigue and fever. HENT:  Negative for congestion. Eyes:  Negative for redness. Respiratory:  Negative for cough, chest tightness, shortness of breath and wheezing. Cardiovascular:  Negative for chest pain and palpitations. Gastrointestinal:  Positive for blood in stool and diarrhea. Negative for abdominal distention, abdominal pain, constipation, nausea, rectal pain and vomiting. Genitourinary:  Negative for difficulty urinating. Musculoskeletal:  Negative for arthralgias. Skin:  Negative for rash. Allergic/Immunologic: Negative for immunocompromised state. Neurological:  Negative for dizziness, light-headedness and headaches. Hematological:  Negative for adenopathy. Psychiatric/Behavioral:  Negative for behavioral problems. Past Medical History:   Diagnosis Date    Anxiety     Hypertension        Family History   Adopted: Yes       Social History     Tobacco Use    Smoking status: Former     Packs/day: 1.00     Years: 10.00     Pack years: 10.00     Types: Cigarettes     Quit date: 2013     Years since quittin.9    Smokeless tobacco: Former     Types: Chew     Quit date: 2013   Vaping Use    Vaping Use: Former    Quit date: 2019    Substances: Always   Substance Use Topics    Alcohol use: Yes     Comment: Moderately -Socially    Drug use: Yes     Types: Marijuana Deadra Howard)     Comment: Has medical MJ card-PTSD-Anxiety       New Prescriptions    No medications on file       Meds Prior to visit:  Current Outpatient Medications on File Prior to Visit   Medication Sig Dispense Refill    lisinopril (PRINIVIL;ZESTRIL) 10 MG tablet Take 1 tablet by mouth daily 30 tablet 3     No current facility-administered medications on file prior to visit.      Allergies   Allergen Reactions    Nebivolol Hcl Other (See Comments)     Pt SAYS THE MEDICATION MADE HIM FEEL UNWELL         OBJECTIVE:  BP (!) 164/81   Pulse (!) 102   Temp 97 °F (36.1 °C) (Temporal)   Ht 5' 9\" (1.753 m)   Wt 213 lb (96.6 kg)   BMI 31.45 kg/m²   BP Readings from Last 2 Encounters:   11/29/22 (!) 164/81   10/13/20 (!) 161/105     Wt Readings from Last 3 Encounters:   11/29/22 213 lb (96.6 kg)   10/13/20 205 lb (93 kg)   09/10/20 198 lb (89.8 kg)       Physical Exam  Vitals and nursing note reviewed. Constitutional:       General: He is not in acute distress. Appearance: Normal appearance. He is well-developed. He is obese. He is not ill-appearing. HENT:      Head: Normocephalic and atraumatic. Right Ear: Tympanic membrane, ear canal and external ear normal. There is no impacted cerumen. Left Ear: Ear canal and external ear normal. There is no impacted cerumen. Nose: Nose normal. No congestion or rhinorrhea. Mouth/Throat:      Mouth: Mucous membranes are moist.      Pharynx: Oropharynx is clear. No oropharyngeal exudate or posterior oropharyngeal erythema. Eyes:      General: No scleral icterus. Right eye: No discharge. Left eye: No discharge. Extraocular Movements: Extraocular movements intact. Conjunctiva/sclera: Conjunctivae normal.      Pupils: Pupils are equal, round, and reactive to light. Cardiovascular:      Rate and Rhythm: Normal rate and regular rhythm. Pulses: Normal pulses. Heart sounds: Normal heart sounds. No murmur heard. Comments: Normal radial and pedal pulses  Pulmonary:      Effort: Pulmonary effort is normal. No respiratory distress. Breath sounds: Normal breath sounds. No wheezing. Chest:      Chest wall: No tenderness. Abdominal:      General: Bowel sounds are normal. There is no distension. Palpations: Abdomen is soft. There is no mass. Tenderness: There is no abdominal tenderness.       Comments: Normal liver and spleen, no organomegaly. Musculoskeletal:         General: No tenderness, deformity or signs of injury. Normal range of motion. Cervical back: Normal range of motion and neck supple. Right lower leg: No edema. Left lower leg: No edema. Comments: Range of motion intact in all extremities   Lymphadenopathy:      Cervical: No cervical adenopathy. Skin:     General: Skin is warm and dry. Capillary Refill: Capillary refill takes less than 2 seconds. Findings: No erythema or rash. Neurological:      General: No focal deficit present. Mental Status: He is alert and oriented to person, place, and time. Mental status is at baseline. Sensory: No sensory deficit. Motor: No abnormal muscle tone. Coordination: Coordination normal.   Psychiatric:         Mood and Affect: Mood normal.         Behavior: Behavior normal.         Thought Content:  Thought content normal.         Judgment: Judgment normal.      Comments:         Lab Results   Component Value Date    WBC 6.6 11/29/2022    HGB 15.6 11/29/2022    HCT 46.9 11/29/2022    MCV 90.5 11/29/2022     11/29/2022     Lab Results   Component Value Date     11/29/2022    K 4.5 11/29/2022     11/29/2022    CO2 25 11/29/2022    BUN 12 11/29/2022    CREATININE 0.7 (L) 11/29/2022    GLUCOSE 104 (H) 11/29/2022    CALCIUM 9.8 11/29/2022    PROT 6.6 11/29/2022    LABALBU 4.6 11/29/2022    BILITOT 0.3 11/29/2022    ALKPHOS 96 11/29/2022    AST 17 11/29/2022    ALT 19 11/29/2022    LABGLOM >60 11/29/2022    GFRAA >60 06/02/2020    AGRATIO 2.3 (H) 11/29/2022    GLOB 2.1 06/02/2020     Lab Results   Component Value Date    CHOL 128 06/02/2020     Lab Results   Component Value Date    TRIG 98 06/02/2020     Lab Results   Component Value Date    HDL 40 06/02/2020     Lab Results   Component Value Date    LDLCALC 68 06/02/2020     Lab Results   Component Value Date    LABVLDL 20 06/02/2020     No results found for: LABA1C      Discussed use, benefit, and side effects of prescribed medications. Barriers to medication compliance addressed. All patient questions answered. Pt voiced understanding. RTC Return in about 4 weeks (around 12/27/2022). No future appointments.       Kurt Eugene MD  11/29/2022  5:32 PM

## 2022-11-30 LAB — TISSUE TRANSGLUTAMINASE IGA: <0.5 U/ML (ref 0–14)

## 2023-01-31 NOTE — PROGRESS NOTES
Chief Complaint   Patient presents with    Otitis Media         ASSESSMENT/PLAN:  1. Ear fullness, bilateral  Increased pressure in sinuses is causing his ears to feel full and the need for him to pop them. Start prednisone and montelukast, continue allegra and montelukast  Follow up in 2-4 weeks to gauge improvement  Discussed prognosis and duration of symptoms and when to RTC  - predniSONE (DELTASONE) 20 MG tablet; Take 1 tablet by mouth daily for 5 days  Dispense: 5 tablet; Refill: 0  - montelukast (SINGULAIR) 10 MG tablet; Take 1 tablet by mouth nightly  Dispense: 90 tablet; Refill: 1    2. Essential hypertension  Uncontrolled in triage. Home numbers are normal  Reviewed creatinine - normal  Continue lisinopril 10 mg daily  Follow up in 6 months to gauge improvement        HPI:  Ariana Dalal is a 40 y.o. (: 1985) here today for ear discomfort and sinus congestion. Sick contacts: not that he knows of but has been traveling/flying for work. Symptoms started about 2 weeks ago with  ear fullness and sinus pressure. He went to a walk in clinic and they gave him Augmentin for a BL ear infection. He did not have fevers. He continuously keeps plugging his nose and trying to pop his ears. Has PND. Has been taking allegra and flonase. Did not test for covid or flu. No body aches or GI symptoms other than his baseline. HTN  Rx: lisinopril 10 mg   Home numbers are WNL; 120s/80s per patient  BP Readings from Last 3 Encounters:   23 (!) 158/84   22 (!) 164/81   10/13/20 (!) 161/105     Lab Results   Component Value Date    CREATININE 0.7 (L) 2022       Review of Systems   Constitutional:  Negative for activity change, chills, fatigue and fever. HENT:  Positive for postnasal drip. Negative for congestion. Eyes:  Negative for redness. Respiratory:  Negative for cough, chest tightness, shortness of breath and wheezing. Cardiovascular:  Negative for chest pain and palpitations. Gastrointestinal:  Negative for abdominal pain and diarrhea. Genitourinary:  Negative for difficulty urinating. Musculoskeletal:  Negative for arthralgias. Skin:  Negative for rash. Allergic/Immunologic: Negative for immunocompromised state. Neurological:  Negative for dizziness, light-headedness and headaches. Hematological:  Negative for adenopathy. Psychiatric/Behavioral:  Negative for behavioral problems. Past Medical History:   Diagnosis Date    Anxiety     Hypertension        Family History   Adopted: Yes       Social History     Tobacco Use    Smoking status: Former     Packs/day: 1.00     Years: 10.00     Pack years: 10.00     Types: Cigarettes     Quit date: 1/1/2013     Years since quitting: 10.0    Smokeless tobacco: Former     Types: Chew     Quit date: 6/2/2013   Vaping Use    Vaping Use: Former    Quit date: 11/1/2019    Substances: Always   Substance Use Topics    Alcohol use: Yes     Comment: Moderately -Socially    Drug use: Yes     Types: Marijuana (Weed)     Comment: Has medical MJ card-PTSD-Anxiety       New Prescriptions    MONTELUKAST (SINGULAIR) 10 MG TABLET    Take 1 tablet by mouth nightly    PREDNISONE (DELTASONE) 20 MG TABLET    Take 1 tablet by mouth daily for 5 days       Meds Prior to visit:  Current Outpatient Medications on File Prior to Visit   Medication Sig Dispense Refill    fluticasone (FLONASE) 50 MCG/ACT nasal spray 1 spray by Each Nostril route daily      lisinopril (PRINIVIL;ZESTRIL) 10 MG tablet Take 1 tablet by mouth daily 30 tablet 3     No current facility-administered medications on file prior to visit.      Allergies   Allergen Reactions    Nebivolol Hcl Other (See Comments)     Pt SAYS THE MEDICATION MADE HIM FEEL UNWELL       OBJECTIVE:  BP (!) 158/84   Pulse 65   Temp 97 °F (36.1 °C) (Temporal)   Ht 5' 9\" (1.753 m)   Wt 217 lb 3.2 oz (98.5 kg)   BMI 32.07 kg/m²   BP Readings from Last 2 Encounters:   02/01/23 (!) 158/84   11/29/22 (!) 164/81 Wt Readings from Last 3 Encounters:   02/01/23 217 lb 3.2 oz (98.5 kg)   11/29/22 213 lb (96.6 kg)   10/13/20 205 lb (93 kg)       Physical Exam  Vitals and nursing note reviewed. Constitutional:       General: He is not in acute distress. Appearance: Normal appearance. HENT:      Head: Normocephalic and atraumatic. Right Ear: Tympanic membrane, ear canal and external ear normal. There is no impacted cerumen. Left Ear: Tympanic membrane, ear canal and external ear normal. There is no impacted cerumen. Nose: Nose normal.      Mouth/Throat:      Mouth: Mucous membranes are moist.      Pharynx: Oropharynx is clear. Eyes:      Extraocular Movements: Extraocular movements intact. Conjunctiva/sclera: Conjunctivae normal.      Pupils: Pupils are equal, round, and reactive to light. Cardiovascular:      Rate and Rhythm: Normal rate and regular rhythm. Pulses: Normal pulses. Heart sounds: Normal heart sounds. Pulmonary:      Effort: Pulmonary effort is normal. No respiratory distress. Breath sounds: Normal breath sounds. No wheezing. Abdominal:      General: Bowel sounds are normal.   Musculoskeletal:         General: No signs of injury. Normal range of motion. Cervical back: Normal range of motion. Skin:     General: Skin is warm. Capillary Refill: Capillary refill takes less than 2 seconds. Findings: No erythema. Neurological:      General: No focal deficit present. Mental Status: He is alert and oriented to person, place, and time. Mental status is at baseline. Psychiatric:         Mood and Affect: Mood normal.         Behavior: Behavior normal.         Thought Content:  Thought content normal.         Judgment: Judgment normal.       Lab Results   Component Value Date    WBC 6.6 11/29/2022    HGB 15.6 11/29/2022    HCT 46.9 11/29/2022    MCV 90.5 11/29/2022     11/29/2022     Lab Results   Component Value Date     11/29/2022    K 4.5 11/29/2022     11/29/2022    CO2 25 11/29/2022    BUN 12 11/29/2022    CREATININE 0.7 (L) 11/29/2022    GLUCOSE 104 (H) 11/29/2022    CALCIUM 9.8 11/29/2022    PROT 6.6 11/29/2022    LABALBU 4.6 11/29/2022    BILITOT 0.3 11/29/2022    ALKPHOS 96 11/29/2022    AST 17 11/29/2022    ALT 19 11/29/2022    LABGLOM >60 11/29/2022    GFRAA >60 06/02/2020    AGRATIO 2.3 (H) 11/29/2022    GLOB 2.1 06/02/2020     Lab Results   Component Value Date    CHOL 128 06/02/2020     Lab Results   Component Value Date    TRIG 98 06/02/2020     Lab Results   Component Value Date    HDL 40 06/02/2020     Lab Results   Component Value Date    LDLCALC 68 06/02/2020     Lab Results   Component Value Date    LABVLDL 20 06/02/2020     No results found for: LABA1C      Discussed use, benefit, and side effects of prescribed medications. Barriers to medication compliance addressed. All patient questions answered. Pt voiced understanding. RTC Return if symptoms worsen or fail to improve. No future appointments.       Erin Pro MD  2/1/2023  7:25 AM

## 2023-02-01 ENCOUNTER — OFFICE VISIT (OUTPATIENT)
Dept: PRIMARY CARE CLINIC | Age: 38
End: 2023-02-01
Payer: COMMERCIAL

## 2023-02-01 VITALS
WEIGHT: 217.2 LBS | HEART RATE: 65 BPM | BODY MASS INDEX: 32.17 KG/M2 | HEIGHT: 69 IN | SYSTOLIC BLOOD PRESSURE: 158 MMHG | TEMPERATURE: 97 F | DIASTOLIC BLOOD PRESSURE: 84 MMHG

## 2023-02-01 DIAGNOSIS — I10 ESSENTIAL HYPERTENSION: ICD-10-CM

## 2023-02-01 DIAGNOSIS — H93.8X3 EAR FULLNESS, BILATERAL: Primary | ICD-10-CM

## 2023-02-01 PROCEDURE — G8484 FLU IMMUNIZE NO ADMIN: HCPCS | Performed by: FAMILY MEDICINE

## 2023-02-01 PROCEDURE — 1036F TOBACCO NON-USER: CPT | Performed by: FAMILY MEDICINE

## 2023-02-01 PROCEDURE — 99214 OFFICE O/P EST MOD 30 MIN: CPT | Performed by: FAMILY MEDICINE

## 2023-02-01 PROCEDURE — G8427 DOCREV CUR MEDS BY ELIG CLIN: HCPCS | Performed by: FAMILY MEDICINE

## 2023-02-01 PROCEDURE — 3079F DIAST BP 80-89 MM HG: CPT | Performed by: FAMILY MEDICINE

## 2023-02-01 PROCEDURE — G8417 CALC BMI ABV UP PARAM F/U: HCPCS | Performed by: FAMILY MEDICINE

## 2023-02-01 PROCEDURE — 3077F SYST BP >= 140 MM HG: CPT | Performed by: FAMILY MEDICINE

## 2023-02-01 RX ORDER — PREDNISONE 20 MG/1
20 TABLET ORAL DAILY
Qty: 5 TABLET | Refills: 0 | Status: SHIPPED | OUTPATIENT
Start: 2023-02-01 | End: 2023-02-06

## 2023-02-01 RX ORDER — MONTELUKAST SODIUM 10 MG/1
10 TABLET ORAL NIGHTLY
Qty: 90 TABLET | Refills: 1 | Status: SHIPPED | OUTPATIENT
Start: 2023-02-01 | End: 2023-02-04

## 2023-02-01 RX ORDER — FLUTICASONE PROPIONATE 50 MCG
1 SPRAY, SUSPENSION (ML) NASAL DAILY
COMMUNITY

## 2023-02-01 SDOH — ECONOMIC STABILITY: FOOD INSECURITY: WITHIN THE PAST 12 MONTHS, YOU WORRIED THAT YOUR FOOD WOULD RUN OUT BEFORE YOU GOT MONEY TO BUY MORE.: NEVER TRUE

## 2023-02-01 SDOH — ECONOMIC STABILITY: FOOD INSECURITY: WITHIN THE PAST 12 MONTHS, THE FOOD YOU BOUGHT JUST DIDN'T LAST AND YOU DIDN'T HAVE MONEY TO GET MORE.: NEVER TRUE

## 2023-02-01 SDOH — ECONOMIC STABILITY: HOUSING INSECURITY
IN THE LAST 12 MONTHS, WAS THERE A TIME WHEN YOU DID NOT HAVE A STEADY PLACE TO SLEEP OR SLEPT IN A SHELTER (INCLUDING NOW)?: NO

## 2023-02-01 SDOH — ECONOMIC STABILITY: INCOME INSECURITY: HOW HARD IS IT FOR YOU TO PAY FOR THE VERY BASICS LIKE FOOD, HOUSING, MEDICAL CARE, AND HEATING?: NOT HARD AT ALL

## 2023-02-01 ASSESSMENT — ENCOUNTER SYMPTOMS
EYE REDNESS: 0
CHEST TIGHTNESS: 0
COUGH: 0
SHORTNESS OF BREATH: 0
DIARRHEA: 0
ABDOMINAL PAIN: 0
WHEEZING: 0

## 2023-02-01 ASSESSMENT — PATIENT HEALTH QUESTIONNAIRE - PHQ9
SUM OF ALL RESPONSES TO PHQ QUESTIONS 1-9: 0
SUM OF ALL RESPONSES TO PHQ9 QUESTIONS 1 & 2: 0
2. FEELING DOWN, DEPRESSED OR HOPELESS: 0
SUM OF ALL RESPONSES TO PHQ QUESTIONS 1-9: 0
SUM OF ALL RESPONSES TO PHQ QUESTIONS 1-9: 0
1. LITTLE INTEREST OR PLEASURE IN DOING THINGS: 0
SUM OF ALL RESPONSES TO PHQ QUESTIONS 1-9: 0

## 2023-02-04 ENCOUNTER — HOSPITAL ENCOUNTER (EMERGENCY)
Age: 38
Discharge: HOME | End: 2023-02-04
Attending: EMERGENCY MEDICINE
Payer: COMMERCIAL

## 2023-02-04 VITALS
SYSTOLIC BLOOD PRESSURE: 154 MMHG | RESPIRATION RATE: 12 BRPM | HEART RATE: 72 BPM | BODY MASS INDEX: 32.01 KG/M2 | DIASTOLIC BLOOD PRESSURE: 87 MMHG | WEIGHT: 216.1 LBS | OXYGEN SATURATION: 97 % | HEIGHT: 69 IN | TEMPERATURE: 98.2 F

## 2023-02-04 DIAGNOSIS — H93.13 TINNITUS OF BOTH EARS: Primary | ICD-10-CM

## 2023-02-04 PROCEDURE — 99283 EMERGENCY DEPT VISIT LOW MDM: CPT

## 2023-02-04 RX ORDER — PREDNISONE 20 MG/1
TABLET ORAL
Qty: 9 TABLET | Refills: 0 | Status: SHIPPED | OUTPATIENT
Start: 2023-02-04

## 2023-02-05 NOTE — DISCHARGE INSTRUCTIONS
Return for fever, severe headache, dizziness, vomiting, bloody drainage from ear, loss of hearing. Talk to your doctor about your lisinopril as this can exacerbate tinnitus in some cases.  Avoid aspirin and other NSAIDS

## 2023-02-05 NOTE — ED PROVIDER NOTES
[facility] EMERGENCY DEPT VISIT      Patient Identification  Pk Munoz is a 40 y.o. male. Chief Complaint   Tinnitus      History of Present Illness:    History was obtained from PATIENT. This is a  40 y.o. male who presents ambulatory  to the ED with complaints of bilateral ringing in ears. States he was treated for ear infections a few weeks ago with he believes Augmentin. He has ear pain and fullness at the time seem to get better however 1 week ago he started getting ringing and fullness in both of his ears. He went to see his primary care physician on Wednesday who told him that there was no signs of infection but that there was fluid in his ears and placed him on prednisone 20 mg daily. Patient has had 4 out of the 5 doses which she gave him but states that the ringing has only gotten worse. It is not pulsatile but more of a constant relatively high-pitched hum. There may be slight decrease in hearing. No drainage from the ear. No dizziness. No fever. No nausea or vomiting. He wanted to make sure that his eardrum was not about ready to be ruptured. He has had no problems with tinnitus in the past.  No new medications. He does not take a lot of aspirin products. 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 He states that he finds it hard to sleep because of the ringing. He tried getting an appointment with an ENT but states that it is 1 month out. .     Past Medical History:   Diagnosis Date    Anxiety     Hypertension        Past Surgical History:   Procedure Laterality Date    TYMPANOSTOMY TUBE PLACEMENT      8 sets     WISDOM TOOTH EXTRACTION         No current facility-administered medications for this encounter.     Current Outpatient Medications:     predniSONE (DELTASONE) 20 MG tablet, 2 po daily for 3 days, 1 po daily for 3 days, Disp: 9 tablet, Rfl: 0    fluticasone (FLONASE) 50 MCG/ACT nasal spray, 1 spray by Each Nostril route daily, Disp: , Rfl:     predniSONE (DELTASONE) 20 MG tablet, Take 1 tablet by mouth daily for 5 days, Disp: 5 tablet, Rfl: 0    lisinopril (PRINIVIL;ZESTRIL) 10 MG tablet, Take 1 tablet by mouth daily, Disp: 30 tablet, Rfl: 3    Allergies   Allergen Reactions    Nebivolol Hcl Other (See Comments)     Pt SAYS THE MEDICATION MADE HIM FEEL UNWELL       Social History     Socioeconomic History    Marital status:      Spouse name: Nevin Wilde    Number of children: 0    Years of education: Not on file    Highest education level: Not on file   Occupational History    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   Tobacco Use    Smoking status: Former     Packs/day: 1.00     Years: 10.00     Pack years: 10.00     Types: Cigarettes     Quit date: 1/1/2013     Years since quitting: 10.0    Smokeless tobacco: Former     Types: Chew     Quit date: 6/2/2013   Vaping Use    Vaping Use: Former    Quit date: 11/1/2019    Substances: Always   Substance and Sexual Activity    Alcohol use: Yes     Comment: Moderately -Socially    Drug use: Yes     Types: Marijuana Ricky Radon)     Comment: Has medical MJ card-PTSD-Anxiety    Sexual activity: Yes     Partners: Female   Other Topics Concern    Not on file   Social History Narrative    Not on file     Social Determinants of Health     Financial Resource Strain: Low Risk     Difficulty of Paying Living Expenses: Not hard at all   Food Insecurity: No Food Insecurity    Worried About Running Out of Food in the Last Year: Never true    [address] in the Last Year: Never true   Transportation Needs: Unknown    Lack of Transportation (Medical): Not on file    Lack of Transportation (Non-Medical):  No   Physical Activity: Not on file   Stress: Not on file   Social Connections: Not on file   Intimate Partner Violence: Not on file   Housing Stability: Unknown    Unable to Pay for Housing in the Last Year: Not on file    Number of Places Lived in the Last Year: Not on file Unstable Housing in the Last Year: No       Nursing Notes Reviewed      PHYSICAL EXAM:  GENERAL APPEARANCE: Leidy Nunez is in no acute respiratory distress. Awake and alert. VITAL SIGNS:   ED Triage Vitals [02/04/23 1957]   Enc Vitals Group      BP (!) 154/87      Heart Rate 72      Resp 12      Temp 98.2 °F (36.8 °C)      Temp Source Oral      SpO2 97 %      Weight 216 lb 1.6 oz (98 kg)      Height 5' 9\" (1.753 m)      Head Circumference       Peak Flow       Pain Score       Pain Loc       Pain Edu? Excl. in [address]? HEAD: Normocephalic, atraumatic. EYES:  Extraocular muscles are intact. Conjunctivas are pink. Negative scleral icterus. ENT:  Mucous membranes are moist.  Pharynx without erythema or exudates. TMs with small effusions and minimal scarring. No erythema. No TM perforation. No mastoid tenderness  NECK: Nontender and supple. CHEST: Clear to auscultation bilaterally. No rales, rhonchi, or wheezing. HEART:  Regular rate and rhythm. No murmurs. Strong and equal pulses   MUSCULOSKELETAL:  Active range of motion of the upper and lower extremities. No edema. NEUROLOGICAL: Awake, alert and oriented x 3. Power intact in the upper and lower extremities. DERMATOLOGIC: No petechiae, rashes, or ecchymoses. ED COURSE AND MEDICAL DECISION MAKING:      Radiology:  All plain films have been evaluated by myself. They may have been overread by radiologist as noted in chart. Other radiologic studies (i.e. CT, MRI, ultrasounds, etc ) have been interpreted by radiologist.     No orders to display       Labs:  No results found for this visit on 02/04/23. Treatment in the department:  Patient received the following while in the ED:  Medications - No data to display      Medical decision making and differential diagnosis:  Social determinants affecting care: none  Chronic medical conditions affecting care: none    Patient with nonpulsatile tinnitus for one week.  No otitis media or perforation on exam. No symptoms of labrynthitis. No other neurologic symptoms. No personal or known family h/o Menieres. Givne the nonpulsatile nature of tinnitus and lack of pain or headache or neurologic symptoms, CT imaging was not performed. No infection to treat. Will increase steroids. Asked patient to followup with PCP about continueing lisinopril as this could worsen tinnitus but not likely the cause since he has been on this for years. Refer to ENT. Clinical Impression:  1. Tinnitus of both ears        Dispo:  Patient will be discharged  at this time. Patient was informed of this decision and agrees with plan. I have discussed lab and xray findings with patient and they understand. Questions were answered to the best of my ability. Followup:  Julio Mensah DO  [address]  [address] [facility] [address] [phone]    Schedule an appointment as soon as possible for a visit       Discharge vitals:  Blood pressure (!) 154/87, pulse 72, temperature 98.2 °F (36.8 °C), temperature source Oral, resp. rate 12, height 5' 9\" (1.753 m), weight 216 lb 1.6 oz (98 kg), SpO2 97 %. Prescriptions given:   New Prescriptions    PREDNISONE (DELTASONE) 20 MG TABLET    2 po daily for 3 days, 1 po daily for 3 days         This chart was created using dragon voice recognition software.         Kieran Bosch MD  02/05/23 [id number]

## 2023-02-05 NOTE — ED TRIAGE NOTES
Pt. Presents with ear ringing primarily in left ear and possible congestion to the right, x 1 week, currently taking prednisone 01/01/2023.

## 2023-02-07 ENCOUNTER — OFFICE VISIT (OUTPATIENT)
Dept: PRIMARY CARE CLINIC | Age: 38
End: 2023-02-07
Payer: COMMERCIAL

## 2023-02-07 VITALS
WEIGHT: 209.2 LBS | DIASTOLIC BLOOD PRESSURE: 82 MMHG | SYSTOLIC BLOOD PRESSURE: 132 MMHG | BODY MASS INDEX: 30.89 KG/M2 | HEART RATE: 90 BPM

## 2023-02-07 DIAGNOSIS — I10 ESSENTIAL HYPERTENSION: ICD-10-CM

## 2023-02-07 DIAGNOSIS — H93.13 TINNITUS OF BOTH EARS: Primary | ICD-10-CM

## 2023-02-07 PROCEDURE — G8417 CALC BMI ABV UP PARAM F/U: HCPCS | Performed by: FAMILY MEDICINE

## 2023-02-07 PROCEDURE — 99214 OFFICE O/P EST MOD 30 MIN: CPT | Performed by: FAMILY MEDICINE

## 2023-02-07 PROCEDURE — 1036F TOBACCO NON-USER: CPT | Performed by: FAMILY MEDICINE

## 2023-02-07 PROCEDURE — G8428 CUR MEDS NOT DOCUMENT: HCPCS | Performed by: FAMILY MEDICINE

## 2023-02-07 PROCEDURE — 3075F SYST BP GE 130 - 139MM HG: CPT | Performed by: FAMILY MEDICINE

## 2023-02-07 PROCEDURE — G8484 FLU IMMUNIZE NO ADMIN: HCPCS | Performed by: FAMILY MEDICINE

## 2023-02-07 PROCEDURE — 3079F DIAST BP 80-89 MM HG: CPT | Performed by: FAMILY MEDICINE

## 2023-02-07 RX ORDER — AZELASTINE HYDROCHLORIDE 137 UG/1
SPRAY, METERED NASAL
COMMUNITY
Start: 2023-02-06

## 2023-02-07 RX ORDER — TRAZODONE HYDROCHLORIDE 100 MG/1
100 TABLET ORAL NIGHTLY PRN
Qty: 30 TABLET | Refills: 5 | Status: SHIPPED | OUTPATIENT
Start: 2023-02-07

## 2023-02-07 NOTE — PROGRESS NOTES
Chief Complaint   Patient presents with    Follow-Up from Hospital       HPI: Mahin Miller  presents for evaluation and management of hypertension and tinnitus. He notes that 3 weeks ago he had a right ear infection. He was started on Augmentin which he stopped after a few days secondary to diarrhea. He was able to resume the medication and his congestion eventually cleared about 2 weeks later. He notes about 2 weeks ago he started having ringing in his years that worsened over the last week. He was prescribed prednisone which helped some but he notes he is not sleeping well and the tinnitus is keeping him up at night. States it is quite loud. States the pitch is low in his left ear and high in his right ear. He has a history of hypertension is taking and tolerating his lisinopril and reports his home blood pressures are typically well controlled           Review of Systems    Allergies   Allergen Reactions    Nebivolol Hcl Other (See Comments)     Pt SAYS THE MEDICATION MADE HIM FEEL UNWELL     New Prescriptions    TRAZODONE (DESYREL) 100 MG TABLET    Take 1 tablet by mouth nightly as needed for Sleep     Current Outpatient Medications   Medication Sig Dispense Refill    traZODone (DESYREL) 100 MG tablet Take 1 tablet by mouth nightly as needed for Sleep 30 tablet 5    fluticasone (FLONASE) 50 MCG/ACT nasal spray 1 spray by Each Nostril route daily      lisinopril (PRINIVIL;ZESTRIL) 10 MG tablet Take 1 tablet by mouth daily 30 tablet 3    Azelastine HCl 137 MCG/SPRAY SOLN       predniSONE (DELTASONE) 20 MG tablet 2 po daily for 3 days, 1 po daily for 3 days (Patient not taking: Reported on 2/7/2023) 9 tablet 0     No current facility-administered medications for this visit.        Past Medical History:   Diagnosis Date    Anxiety     Hypertension          Objective   /82   Pulse 90   Wt 209 lb 3.2 oz (94.9 kg)   BMI 30.89 kg/m²   Wt Readings from Last 3 Encounters:   02/07/23 209 lb 3.2 oz (94.9 kg) 02/04/23 216 lb 1.6 oz (98 kg)   02/01/23 217 lb 3.2 oz (98.5 kg)       Physical Exam  Constitutional:       Appearance: Normal appearance. He is well-developed. HENT:      Right Ear: Tympanic membrane and ear canal normal.      Left Ear: Tympanic membrane and ear canal normal.      Nose: Nose normal.      Mouth/Throat:      Pharynx: Uvula midline. Eyes:      General: No scleral icterus. Conjunctiva/sclera: Conjunctivae normal.      Pupils: Pupils are equal, round, and reactive to light. Neck:      Thyroid: No thyromegaly. Cardiovascular:      Rate and Rhythm: Normal rate and regular rhythm. Pulses:           Dorsalis pedis pulses are 2+ on the right side and 2+ on the left side. Posterior tibial pulses are 2+ on the right side and 2+ on the left side. Heart sounds: Normal heart sounds. No murmur heard. No friction rub. No gallop. Comments: no edema lower extremities  Pulmonary:      Effort: Pulmonary effort is normal.      Breath sounds: Normal breath sounds. No wheezing or rales. Abdominal:      General: Bowel sounds are normal. There is no distension. Palpations: Abdomen is soft. There is no mass. Tenderness: There is no abdominal tenderness. There is no rebound. Musculoskeletal:      Cervical back: Neck supple. Lymphadenopathy:      Cervical: No cervical adenopathy. Skin:     General: Skin is warm and dry. Findings: No erythema or rash. Psychiatric:         Behavior: Behavior normal.         Thought Content:  Thought content normal.         Judgment: Judgment normal.         Chemistry        Component Value Date/Time     11/29/2022 1044    K 4.5 11/29/2022 1044     11/29/2022 1044    CO2 25 11/29/2022 1044    BUN 12 11/29/2022 1044    CREATININE 0.7 (L) 11/29/2022 1044        Component Value Date/Time    CALCIUM 9.8 11/29/2022 1044    ALKPHOS 96 11/29/2022 1044    AST 17 11/29/2022 1044    ALT 19 11/29/2022 1044    BILITOT 0.3 11/29/2022 1044          Lab Results   Component Value Date    WBC 6.6 11/29/2022    HGB 15.6 11/29/2022    HCT 46.9 11/29/2022    MCV 90.5 11/29/2022     11/29/2022     No results found for: LABA1C  No results found for: EAG  No results found for: LABA1C  No components found for: CHLPL  Lab Results   Component Value Date    TRIG 98 06/02/2020     Lab Results   Component Value Date    HDL 40 06/02/2020     Lab Results   Component Value Date    LDLCALC 68 06/02/2020     Lab Results   Component Value Date    LABVLDL 20 06/02/2020         Assessment   Plan   1. Tinnitus of both ears  Uncertain etiology. Counseled patient to try distraction and 478 breathing. We will give him some trazodone to help him sleep at night and we will follow-up in 1 month. If persisting may need MRI with attention to acoustical nerves    2. Essential hypertension  Initially elevated it came down nicely with recheck in clinic. RTC Return in about 1 month (around 3/7/2023).

## 2023-02-07 NOTE — PATIENT INSTRUCTIONS
4 -7- 8 Breath Relaxation Exercise To manage Stress/Anxiety    Anyone can do it. .. Simple    Quick    No equipment needed    Do it anywhere    Are the numbers important? The absolute time you spend on each phase is not important; the ratio of 4:7:8 is important. If you have trouble  holding your breath, speed the exercise up but keep to  the ratio of 4:7:8 for the three phases. With practice you  can slow it all down and get used to inhaling and exhaling more and more deeply. Why should I do it? This exercise is a natural tranquilizer for the nervous system. Unlike tranquilizing drugs, which are often effective  when you first take them but then lose their power over  time, this exercise is subtle when you first try it but gains  in power with repetition and practice. Use this new skill  whenever anything upsetting happens - before you react. Use it whenever you are aware of internal tension. Use it  to help you fall asleep. How often? Do it at least twice a day. You cannot do it too frequently. Do not do more than four breaths at one time for the first  month of practice. Later, if you wish, you can extend it to  eight breaths. If you feel a little lightheaded when you  first breathe this way, do not be concerned - it will pass. STEPS  Exhale completely through your mouth, making a whoosh sound. Close your mouth and inhale quietly through your nose to a mental count of 4. Hold your breath for a count of 7. Exhale completely through your mouth, making a whoosh sound to a count of 8. This is one breath. Now inhale again and repeat the cycle three more times for a total of four breaths. Beginner Tips:  Ideally, sit with your back straight. Place the tip of your tongue against the ridge of tissue just  behind your upper front teeth, and keep it there through the  entire exercise.   Exhale through your mouth around your tongue; try pursing  your lips slightly if this seems awkward

## 2023-02-09 ENCOUNTER — TELEPHONE (OUTPATIENT)
Dept: PRIMARY CARE CLINIC | Age: 38
End: 2023-02-09

## 2023-02-09 NOTE — TELEPHONE ENCOUNTER
Pt started traZODone (DESYREL) 100 MG tablet   He said its not working and its causing erectile dysfunction issues, He said he used to take Ambien and it worked . Would like to know if he can have that since its just for a short time. Due to him coming off prednisone he's  having insomnia issues. He was prescribed the Trazodone per dr. Prieto Saleem on the 6th of this month.

## 2023-02-09 NOTE — TELEPHONE ENCOUNTER
Spoke to patient and he understood he said he will give it a few more days and rebecca melatonin if he needs to . He hasn't had sleep in 5 days .

## 2023-02-09 NOTE — TELEPHONE ENCOUNTER
Now that he is finished with the prednisone, his sleeping schedule should get back to normal. He should monitor this a while longer before a controlled substance is prescribed. He can try 150 mg of trazodone if he would like (1.5 tabs).      Dr. Jain Done

## 2023-02-21 DIAGNOSIS — I10 ESSENTIAL HYPERTENSION: ICD-10-CM

## 2023-02-21 RX ORDER — LISINOPRIL 10 MG/1
10 TABLET ORAL DAILY
Qty: 30 TABLET | Refills: 3 | Status: SHIPPED | OUTPATIENT
Start: 2023-02-21

## 2023-02-21 NOTE — TELEPHONE ENCOUNTER
Medication:   Requested Prescriptions     Pending Prescriptions Disp Refills    lisinopril (PRINIVIL;ZESTRIL) 10 MG tablet 30 tablet 3     Sig: Take 1 tablet by mouth daily        Last Filled:  02/24/21    Patient Phone Number: [phone] (home)     Last appt: 2/7/2023   Next appt: 3/7/2023    Last OARRS: No flowsheet data found.

## 2023-03-01 RX ORDER — TRAZODONE HYDROCHLORIDE 100 MG/1
TABLET ORAL
Qty: 30 TABLET | Refills: 5 | Status: SHIPPED | OUTPATIENT
Start: 2023-03-01

## 2023-03-01 NOTE — TELEPHONE ENCOUNTER
Medication:   Requested Prescriptions     Pending Prescriptions Disp Refills    traZODone (DESYREL) 100 MG tablet [Pharmacy Med Name: TRAZODONE 100 MG TABLET] 30 tablet 5     Sig: TAKE 1 TABLET BY MOUTH EVERY DAY AT BEDTIME AS NEEDED FOR SLEEP        Last Filled:  2/7/23    Patient Phone Number: [phone] (home)     Last appt: 2/7/2023   Next appt: 3/7/2023    Last OARRS: No flowsheet data found.

## 2023-06-05 DIAGNOSIS — I10 ESSENTIAL HYPERTENSION: ICD-10-CM

## 2023-06-05 RX ORDER — LISINOPRIL 10 MG/1
TABLET ORAL
Qty: 90 TABLET | Refills: 1 | Status: SHIPPED | OUTPATIENT
Start: 2023-06-05

## 2023-06-05 NOTE — TELEPHONE ENCOUNTER
Medication:   Requested Prescriptions     Pending Prescriptions Disp Refills    lisinopril (PRINIVIL;ZESTRIL) 10 MG tablet [Pharmacy Med Name: LISINOPRIL 10 MG TABLET] 90 tablet 1     Sig: TAKE 1 TABLET BY MOUTH EVERY DAY        Last Filled:  Last refill: 5/10/2023    Patient Phone Number: [phone] (home)     Last appt: 2/7/2023   Next appt: Visit date not found  RTC Return in about 1 month (around 3/7/2023). Last OARRS: No flowsheet data found.

## 2023-12-07 DIAGNOSIS — I10 ESSENTIAL HYPERTENSION: ICD-10-CM

## 2023-12-07 RX ORDER — LISINOPRIL 10 MG/1
TABLET ORAL
Qty: 90 TABLET | Refills: 1 | OUTPATIENT
Start: 2023-12-07

## 2023-12-08 RX ORDER — LISINOPRIL 10 MG/1
10 TABLET ORAL DAILY
Qty: 90 TABLET | Refills: 1 | OUTPATIENT
Start: 2023-12-08

## 2023-12-08 NOTE — TELEPHONE ENCOUNTER
Medication:   Requested Prescriptions     Pending Prescriptions Disp Refills    lisinopril (PRINIVIL;ZESTRIL) 10 MG tablet 90 tablet 1     Sig: Take 1 tablet by mouth daily        Last Filled:  06/5/23    Patient Phone Number: [phone] (home)     Last appt: 2/7/2023   Next appt: Visit date not found    Last OARRS:        No data to display